# Patient Record
Sex: MALE | Race: WHITE | URBAN - METROPOLITAN AREA
[De-identification: names, ages, dates, MRNs, and addresses within clinical notes are randomized per-mention and may not be internally consistent; named-entity substitution may affect disease eponyms.]

---

## 2023-02-15 ENCOUNTER — OFFICE VISIT (OUTPATIENT)
Dept: URBAN - METROPOLITAN AREA CLINIC 44 | Facility: CLINIC | Age: 33
End: 2023-02-15
Payer: COMMERCIAL

## 2023-02-15 DIAGNOSIS — H00.11 CHALAZION OF RIGHT UPPER EYELID: Primary | ICD-10-CM

## 2023-02-15 DIAGNOSIS — H00.12 CHALAZION OF RIGHT LOWER EYELID: ICD-10-CM

## 2023-02-15 PROCEDURE — 99203 OFFICE O/P NEW LOW 30 MIN: CPT | Performed by: OPTOMETRIST

## 2023-02-15 ASSESSMENT — INTRAOCULAR PRESSURE
OS: 17
OD: 17

## 2023-02-15 NOTE — IMPRESSION/PLAN
Impression: Chalazion of right lower eyelid: H00.12. Atypical lesion below the lid line. Non tender. Rapid appearance. Not responding to Hot compresses.  Plan: PLAN: See H00.11

## 2023-02-15 NOTE — IMPRESSION/PLAN
Impression: Chalazion of right upper eyelid: H00.11. Chronic. Been there since October. Non tender. Been using Doxy, hot compresses. Plan: PLAN: Discussed findings.  Rec consult with Cali Causey for removal.

## 2023-05-04 ENCOUNTER — OFFICE VISIT (OUTPATIENT)
Dept: URBAN - METROPOLITAN AREA CLINIC 44 | Facility: CLINIC | Age: 33
End: 2023-05-04
Payer: COMMERCIAL

## 2023-05-04 DIAGNOSIS — H02.822 CYST OF RT LOWER EYELID: ICD-10-CM

## 2023-05-04 DIAGNOSIS — H00.11 CHALAZION OF RIGHT UPPER EYELID: Primary | ICD-10-CM

## 2023-05-04 PROCEDURE — 99203 OFFICE O/P NEW LOW 30 MIN: CPT | Performed by: STUDENT IN AN ORGANIZED HEALTH CARE EDUCATION/TRAINING PROGRAM

## 2023-05-04 PROCEDURE — 11900 INJECT SKIN LESIONS </W 7: CPT | Performed by: STUDENT IN AN ORGANIZED HEALTH CARE EDUCATION/TRAINING PROGRAM

## 2023-05-04 RX ORDER — NEOMYCIN SULFATE, POLYMYXIN B SULFATE AND DEXAMETHASONE 3.5; 10000; 1 MG/G; [USP'U]/G; MG/G
OINTMENT OPHTHALMIC
Qty: 3.5 | Refills: 0 | Status: ACTIVE
Start: 2023-05-04

## 2023-05-04 NOTE — IMPRESSION/PLAN
Impression: Chalazion of right upper eyelid: H00.11. Plan: Improving with conservative therapies. Patient not bothered by residual lesion. Will continue to monitor.

## 2023-05-04 NOTE — IMPRESSION/PLAN
Impression: Cyst of rt lower eyelid: H02.822. Plan: RLL skin lesion, several mm below tarsal plate. Patient reports lesion popped and drained through skin and has since gotten much smaller. Residual area of redness, firm. Appearance consistent with scar/inflammatory tissue from where lesion erupted through skin. No concerning features. Discussed options of continued observation versus kenalog injection versus excision. Discussed excision puts patient at risk for lid retraction given minimal lower lid skin laxity. Patient elects to proceed with kenalog injection. Area cleaned with alcohol swab. Kenalog-10 (0.1cc) injected into left lower eyelid. Patient tolerated well, no complications. Return 1 month or sooner prn.